# Patient Record
Sex: MALE | Race: WHITE | NOT HISPANIC OR LATINO | Employment: FULL TIME | URBAN - METROPOLITAN AREA
[De-identification: names, ages, dates, MRNs, and addresses within clinical notes are randomized per-mention and may not be internally consistent; named-entity substitution may affect disease eponyms.]

---

## 2017-04-05 ENCOUNTER — OFFICE VISIT (OUTPATIENT)
Dept: URGENT CARE | Facility: URGENT CARE | Age: 52
End: 2017-04-05
Payer: COMMERCIAL

## 2017-04-05 VITALS
WEIGHT: 193 LBS | DIASTOLIC BLOOD PRESSURE: 74 MMHG | OXYGEN SATURATION: 98 % | TEMPERATURE: 98.7 F | HEART RATE: 58 BPM | SYSTOLIC BLOOD PRESSURE: 122 MMHG

## 2017-04-05 DIAGNOSIS — J02.9 SORE THROAT: Primary | ICD-10-CM

## 2017-04-05 DIAGNOSIS — J06.9 VIRAL UPPER RESPIRATORY TRACT INFECTION: ICD-10-CM

## 2017-04-05 LAB
DEPRECATED S PYO AG THROAT QL EIA: NORMAL
MICRO REPORT STATUS: NORMAL
SPECIMEN SOURCE: NORMAL

## 2017-04-05 PROCEDURE — 87081 CULTURE SCREEN ONLY: CPT | Performed by: FAMILY MEDICINE

## 2017-04-05 PROCEDURE — 87880 STREP A ASSAY W/OPTIC: CPT | Performed by: FAMILY MEDICINE

## 2017-04-05 PROCEDURE — 99203 OFFICE O/P NEW LOW 30 MIN: CPT | Performed by: PHYSICIAN ASSISTANT

## 2017-04-05 NOTE — NURSING NOTE
Chief Complaint   Patient presents with     Urgent Care     Sick     Miserable since Saturday, daughter has strep.     Initial /74  Pulse 58  Temp 98.7  F (37.1  C) (Oral)  Wt 193 lb (87.5 kg)  SpO2 98% There is no height or weight on file to calculate BMI.  BP completed using cuff size  large    Sandra Jean-Baptiste/CMA

## 2017-04-05 NOTE — MR AVS SNAPSHOT
"              After Visit Summary   2017    Isak Clemons    MRN: [id number]           Patient Information     Date Of Birth          1965        Visit Information        Provider Department      2017 4:15 PM Elaina Bnasal PA-C [facility]        Today's Diagnoses     Sore throat    -  1    Viral upper respiratory tract infection           Follow-ups after your visit        Who to contact     If you have questions or need follow up information about today's clinic visit or your schedule please contact [facility] directly at [phone].  Normal or non-critical lab and imaging results will be communicated to you by Augmenixhart, letter or phone within 4 business days after the clinic has received the results. If you do not hear from us within 7 days, please contact the clinic through Androcialt or phone. If you have a critical or abnormal lab result, we will notify you by phone as soon as possible.  Submit refill requests through OrthAlign or call your pharmacy and they will forward the refill request to us. Please allow 3 business days for your refill to be completed.          Additional Information About Your Visit        MyChart Information     Or[facility]Align lets you send messages to your doctor, view your test results, renew your prescriptions, schedule appointments and more. To sign up, go to [email or web address] . Click on \"Log in\" on the left side of the screen, which will take you to the Welcome page. Then click on \"Sign up Now\" on the right side of the page.     You will be asked to enter the access code listed below, as well as some personal information. Please follow the directions to create your username and password.     Your access code is: [email or web address]  Expires: 2017  5:30 PM     Your access code will  in 90 days. If you need help or a new code, please call your [place] clinic or [phone].        Care EveryWhere ID     This is your Care EveryWhere " ID. This could be used by other organizations to access your [place] medical records  [id number]        Your Vitals Were     Pulse Temperature Pulse Oximetry             58 98.7  F (37.1  C) (Oral) 98%          Blood Pressure from Last 3 Encounters:   04/05/17 122/74    Weight from Last 3 Encounters:   04/05/17 193 lb (87.5 kg)              We Performed the Following     Beta strep group A culture     Strep, Rapid Screen        Primary Care Provider    None Specified       No primary provider on file.        Thank you!     Thank you for choosing [facility]  for your care. Our goal is always to provide you with excellent care. Hearing back from our patients is one way we can continue to improve our services. Please take a few minutes to complete the written survey that you may receive in the mail after your visit with us. Thank you!             Your Updated Medication List - Protect others around you: Learn how to safely use, store and throw away your medicines at www.disposemymeds.org.      Notice  As of 4/5/2017  5:30 PM    You have not been prescribed any medications.

## 2017-04-05 NOTE — PROGRESS NOTES
"SUBJECTIVE:   Isak Clemons is a 51 year old male presenting with a chief complaint of fever, \"cold symptoms\", cough  and myalgias.  Onset of symptoms was 4 day(s) ago.  Course of illness is same and improving.    Severity moderate  Current and Associated symptoms: cough  Treatment measures tried include Fluids, OTC meds and Rest.  Predisposing factors include strep exposure from daughter.     No past medical history on file.  No current outpatient prescriptions on file.     Social History   Substance Use Topics     Smoking status: Never Smoker     Smokeless tobacco: Not on file     Alcohol use Not on file       ROS:  Review of systems negative except as stated above.    OBJECTIVE  :/74  Pulse 58  Temp 98.7  F (37.1  C) (Oral)  Wt 193 lb (87.5 kg)  SpO2 98%  GENERAL APPEARANCE: healthy, alert and no distress  EYES: EOMI,  PERRL, conjunctiva clear  HENT: TM's normal bilaterally, nasal turbinates erythematous, swollen and rhinorrhea clear. Oropharynx erythematous.   NECK: supple, nontender, no lymphadenopathy  RESP: lungs clear to auscultation - no rales, rhonchi or wheezes  CV: regular rates and rhythm, normal S1 S2, no murmur noted  ABDOMEN:  soft, nontender, no HSM or masses and bowel sounds normal  NEURO: Normal strength and tone, sensory exam grossly normal,  normal speech and mentation  SKIN: no suspicious lesions or rashes    Results for orders placed or performed in visit on 04/05/17   Strep, Rapid Screen   Result Value Ref Range    Specimen Description Throat     Rapid Strep A Screen       NEGATIVE: No Group A streptococcal antigen detected by immunoassay, await   culture report.      Micro Report Status FINAL 04/05/2017        ASSESSMENT/PLAN:  1. Sore throat        Push fluids and rest. Honey for cough, humidified air at night.   - Strep, Rapid Screen  - Beta strep group A culture    I have discussed the patient's diagnosis and my plan of treatment with the patient. We went over any labs or imaging. " Patient is aware to come back in with worsening symptoms or if no relief despite treatment plan.  Patient verbalizes understanding. All questions were addressed and answered.   Elaina Bansal PA-C

## 2017-04-07 LAB
BACTERIA SPEC CULT: NORMAL
MICRO REPORT STATUS: NORMAL
SPECIMEN SOURCE: NORMAL

## 2023-02-02 ENCOUNTER — ANCILLARY PROCEDURE (OUTPATIENT)
Dept: GENERAL RADIOLOGY | Facility: CLINIC | Age: 58
End: 2023-02-02
Payer: COMMERCIAL

## 2023-02-02 ENCOUNTER — OFFICE VISIT (OUTPATIENT)
Dept: PEDIATRICS | Facility: CLINIC | Age: 58
End: 2023-02-02
Payer: COMMERCIAL

## 2023-02-02 VITALS
OXYGEN SATURATION: 97 % | RESPIRATION RATE: 16 BRPM | WEIGHT: 204 LBS | SYSTOLIC BLOOD PRESSURE: 134 MMHG | HEART RATE: 53 BPM | TEMPERATURE: 97.6 F | DIASTOLIC BLOOD PRESSURE: 86 MMHG

## 2023-02-02 DIAGNOSIS — J93.9 PNEUMOTHORAX, LEFT: Primary | ICD-10-CM

## 2023-02-02 DIAGNOSIS — J93.9 PNEUMOTHORAX, LEFT: ICD-10-CM

## 2023-02-02 PROCEDURE — 99203 OFFICE O/P NEW LOW 30 MIN: CPT | Mod: GC | Performed by: STUDENT IN AN ORGANIZED HEALTH CARE EDUCATION/TRAINING PROGRAM

## 2023-02-02 PROCEDURE — 71046 X-RAY EXAM CHEST 2 VIEWS: CPT | Mod: TC | Performed by: RADIOLOGY

## 2023-02-02 RX ORDER — ACETAMINOPHEN 325 MG/1
650 TABLET ORAL
COMMUNITY
Start: 2023-01-27 | End: 2023-12-11

## 2023-02-02 RX ORDER — OXYCODONE HYDROCHLORIDE 5 MG/1
2.5 TABLET ORAL
COMMUNITY
Start: 2023-01-27 | End: 2023-12-11

## 2023-02-02 ASSESSMENT — PAIN SCALES - GENERAL: PAINLEVEL: MILD PAIN (2)

## 2023-02-02 NOTE — PROGRESS NOTES
Assessment & Plan     Pneumothorax, left  Patient seen in follow-up for traumatic left-sided tiny apical pneumothorax.  Chest x-ray with question of potential residual left-sided apical pneumothorax as well as obvious persistent left-sided pleural effusion.  Clinically is significantly improved and vital signs are stable.  Awaiting radiology read.  If radiology read is consistent with pneumothorax patient return in 1 week for repeat imaging at that time.  If follow-up imaging demonstrates persistence of pneumothorax would consider surgical referral for potential intervention/pleurodesis versus if radiology read today does not show any further pneumothorax patient can be cleared as clinically resolved and if no radiographic evidence would be completely clear.  As next plane trip is not for several more weeks at which time would likely be safe to fly.  - XR Chest 2 Views  - XR Chest 2 Views      F/u pending results of CXR  *RESULTS RETURNED - no PTX per radiology, pt updated over the phone    Pt discussed w/staff attending physician, Dr. Zayas.  Jean Marie Okeefe MD  [facility] [place] Parisi is a 57 year old accompanied by his partner, presenting for the following health issues:  Follow Up      HPI     ED/UC Followup:    Facility:  [facility]  Date of visit: 1/26/2023  Reason for visit: pneumothorax left, closed fracture of a rib left  Current Status: stable    Patient was playing hockey when he fell on the ice and developed rib fractures of the seventh and eighth rib was found to also have a tiny apical pneumothorax on CT imaging at [facility] on 1/26/23 was observed overnight without issue and discharged with as needed oxycodone..    Since being home has noted improvement in his shortness of breath as well as in his pleuritic chest pain which has completely resolved.  Still notices some pain to palpation of his ribs but no apparent discomfort.  No chest pain.  No  syncope.  No dyspnea.  He is still sleeping in a chair however this is because of tenderness to the ribs on the side.    of note patient and wife are planning on taking a trip in 1 month at the beginning of March 2023 which would include airplane ride.    Review of Systems   Constitutional, HEENT, cardiovascular, pulmonary, gi and gu systems are negative, except as otherwise noted.      Objective    /86   Pulse 53   Temp 97.6  F (36.4  C)   Resp 16   Wt 92.5 kg (204 lb)   SpO2 97%   There is no height or weight on file to calculate BMI.  Physical Exam   GENERAL: healthy, alert and no distress  EYES: Eyes grossly normal to inspection,  HENT: ear canals and TM's normal  NECK: no adenopathy, trachea midline  RESP: lungs clear to auscultation - no rales, rhonchi or wheezes  CV: regular rate and rhythm, WWP, no LE edema  MS: no gross musculoskeletal defects noted, no edema  SKIN: no suspicious lesions or rashes  NEURO: Normal strength and tone, mentation intact and speech normal  PSYCH: mentation appears normal, affect normal/bright    Xray - Reviewed and interpreted by me.  ? Of residual left ptx, and left pleural effusion. Awaiting rads read

## 2023-04-23 ENCOUNTER — HEALTH MAINTENANCE LETTER (OUTPATIENT)
Age: 58
End: 2023-04-23

## 2023-12-11 ENCOUNTER — OFFICE VISIT (OUTPATIENT)
Dept: PEDIATRICS | Facility: CLINIC | Age: 58
End: 2023-12-11
Payer: COMMERCIAL

## 2023-12-11 VITALS
HEART RATE: 65 BPM | WEIGHT: 200.9 LBS | BODY MASS INDEX: 29.76 KG/M2 | TEMPERATURE: 96.8 F | OXYGEN SATURATION: 97 % | HEIGHT: 69 IN | SYSTOLIC BLOOD PRESSURE: 132 MMHG | DIASTOLIC BLOOD PRESSURE: 82 MMHG | RESPIRATION RATE: 16 BRPM

## 2023-12-11 DIAGNOSIS — M23.306 MENISCUS DEGENERATION, RIGHT: ICD-10-CM

## 2023-12-11 DIAGNOSIS — Z01.818 PREOP GENERAL PHYSICAL EXAM: Primary | ICD-10-CM

## 2023-12-11 PROCEDURE — 99214 OFFICE O/P EST MOD 30 MIN: CPT | Performed by: PEDIATRICS

## 2023-12-11 ASSESSMENT — PAIN SCALES - GENERAL: PAINLEVEL: NO PAIN (0)

## 2023-12-11 NOTE — PROGRESS NOTES
[facility]  [address]  [address]  [place] [address]  Phone: [phone]  Fax: [phone]  Primary Provider: No Ref-Primary, Physician  Pre-op Performing Provider: CRISTIAN NELSON      PREOPERATIVE EVALUATION:  Today's date: 12/11/2023    Isak is a 58 year old, presenting for the following:  Pre-Op Exam        12/11/2023     3:30 PM   Additional Questions   Roomed by immanuel   Accompanied by na         12/11/2023     3:30 PM   Patient Reported Additional Medications   Patient reports taking the following new medications na       Surgical Information:  Surgery/Procedure: right knee meniscal repair  Surgery Location: [facility]   Surgeon:    Surgery Date: 1/3/2024  Time of Surgery: tbd  Where patient plans to recover: At home with family  Fax number for surgical facility: [phone]    Assessment & Plan     The proposed surgical procedure is considered INTERMEDIATE risk.    ICD-10-CM    1. Preop general physical exam  Z01.818       2. Meniscus degeneration, right  M23.306                  - No identified additional risk factors other than previously addressed    Antiplatelet or Anticoagulation Medication Instructions:   - Patient is on no antiplatelet or anticoagulation medications.    Additional Medication Instructions:  Patient is on no additional chronic medications    RECOMMENDATION:  APPROVAL GIVEN to proceed with proposed procedure, without further diagnostic evaluation.        Subjective       HPI related to upcoming procedure: Right knee meniscal injury requiring surgical         12/11/2023     3:21 PM   Preop Questions   1. Have you ever had a heart attack or stroke? No   2. Have you ever had surgery on your heart or blood vessels, such as a stent placement, a coronary artery bypass, or surgery on an artery in your head, neck, heart, or legs? No   3. Do you have chest pain with activity? No   4. Do you have a history of  heart failure? No   5. Do  "you currently have a cold, bronchitis or symptoms of other infection? Yes - cold 2 weeks ago - has now recovered   6. Do you have a cough, shortness of breath, or wheezing? No   7. Do you or anyone in your family have previous history of blood clots? No   8. Do you or does anyone in your family have a serious bleeding problem such as prolonged bleeding following surgeries or cuts? No   9. Have you ever had problems with anemia or been told to take iron pills? No   10. Have you had any abnormal blood loss such as black, tarry or bloody stools? No   11. Have you ever had a blood transfusion? No   12. Are you willing to have a blood transfusion if it is medically needed before, during, or after your surgery? Yes   13. Have you or any of your relatives ever had problems with anesthesia? No   14. Do you have sleep apnea, excessive snoring or daytime drowsiness? No   15. Do you have any artifical heart valves or other implanted medical devices like a pacemaker, defibrillator, or continuous glucose monitor? No   16. Do you have artificial joints? No   17. Are you allergic to latex? No             Review of Systems  Constitutional, neuro, ENT, endocrine, pulmonary, cardiac, gastrointestinal, genitourinary, musculoskeletal, integument and psychiatric systems are negative, except as otherwise noted.    There are no problems to display for this patient.     No past medical history on file.  Past Surgical History:   Procedure Laterality Date    COLONOSCOPY      VASECTOMY Bilateral      No current outpatient medications on file.       No Known Allergies     Social History     Tobacco Use    Smoking status: Never    Smokeless tobacco: Never   Substance Use Topics    Alcohol use: Not on file     History   Drug Use Not on file         Objective     /82   Pulse 65   Temp 96.8  F (36  C) (Tympanic)   Resp 16   Ht 1.753 m (5' 9\")   Wt 91.1 kg (200 lb 14.4 oz)   SpO2 97%   BMI 29.67 kg/m    Wt Readings from Last 4 " Encounters:   12/11/23 91.1 kg (200 lb 14.4 oz)   02/02/23 92.5 kg (204 lb)   04/05/17 87.5 kg (193 lb)     In great shape - plays hockey, pickle ball at baseline.   Works out at [employer].  No cardiac, pulmonary, renal history.        Physical Exam    GENERAL APPEARANCE: healthy, alert and no distress     EYES: EOMI,  PERRL     HENT: ear canals and TM's normal and nose and mouth without ulcers or lesions     NECK: no adenopathy, no asymmetry, masses, or scars and thyroid normal to palpation     RESP: lungs clear to auscultation - no rales, rhonchi or wheezes     CV: regular rates and rhythm, normal S1 S2, no S3 or S4 and no murmur, click or rub     ABDOMEN:  soft, nontender, no HSM or masses and bowel sounds normal     MS: extremities normal- no gross deformities noted, no evidence of inflammation in joints, FROM in all extremities.     SKIN: no suspicious lesions or rashes     NEURO: Normal strength and tone, sensory exam grossly normal, mentation intact and speech normal     PSYCH: mentation appears normal. and affect normal/bright    Diagnostics:  No labs were ordered during this visit.   No EKG required, no history of coronary heart disease, significant arrhythmia, peripheral arterial disease or other structural heart disease.    Revised Cardiac Risk Index (RCRI):  The patient has the following serious cardiovascular risks for perioperative complications:   - No serious cardiac risks = 0 points     RCRI Interpretation: 0 points: Class I (very low risk - 0.4% complication rate)         Signed Electronically by: Bibi Templeton MD  Copy of this evaluation report is provided to requesting physician.

## 2024-04-04 ENCOUNTER — OFFICE VISIT (OUTPATIENT)
Dept: INTERNAL MEDICINE | Facility: CLINIC | Age: 59
End: 2024-04-04
Payer: COMMERCIAL

## 2024-04-04 VITALS
TEMPERATURE: 98.4 F | DIASTOLIC BLOOD PRESSURE: 84 MMHG | HEIGHT: 69 IN | RESPIRATION RATE: 18 BRPM | SYSTOLIC BLOOD PRESSURE: 134 MMHG | BODY MASS INDEX: 30.36 KG/M2 | OXYGEN SATURATION: 97 % | WEIGHT: 205 LBS | HEART RATE: 60 BPM

## 2024-04-04 DIAGNOSIS — R01.1 SYSTOLIC MURMUR: Primary | ICD-10-CM

## 2024-04-04 DIAGNOSIS — R03.0 ELEVATED BP WITHOUT DIAGNOSIS OF HYPERTENSION: ICD-10-CM

## 2024-04-04 PROCEDURE — 99213 OFFICE O/P EST LOW 20 MIN: CPT | Mod: 25 | Performed by: NURSE PRACTITIONER

## 2024-04-04 PROCEDURE — 93000 ELECTROCARDIOGRAM COMPLETE: CPT | Performed by: NURSE PRACTITIONER

## 2024-04-04 NOTE — PROGRESS NOTES
"  Assessment & Plan     Systolic murmur  On exam noted a systolic murmur.  EKG sinus bradycardia  Will check echocardiogram  - Echocardiogram Complete; Future  - EKG 12-lead complete w/read - Clinics    Elevated BP without diagnosis of hypertension  In reasonable range in clinic.  He has a daughter who can check his blood pressure at home and we discussed how to do it accurately.  He will watch it in 3 months time and will come back in for a blood pressure check unless his blood pressure is very high at home prior to that  - EKG 12-lead complete w/read - Clinics      25 minutes spent by me on the date of the encounter doing chart review, history and exam, documentation and further activities per the note      BMI  Estimated body mass index is 30.27 kg/m  as calculated from the following:    Height as of this encounter: 1.753 m (5' 9\").    Weight as of this encounter: 93 kg (205 lb).   Weight management plan: Discussed healthy diet and exercise guidelines      Patient Instructions   Work on diet and exercise     We would like blood pressure less then 140/90     Recheck in 3 months     Echocardiogram     Subjective   Isak is a 58 year old, presenting for the following health issues:  Hypertension (Elevated bp)    Blood pressure 154-159/90 at home   Daughter is nursing student and takes blood pressure with cuff at home    In office blood pressure is within normal range     He does not like needles so would prefer not to get lab work done today, but will do it in 3 months time when he comes back.  Discussed that if we do start blood pressure medicine the likely medication we would try first would be losartan    Discussed normal blood pressure goal less then 140/90  Can work on diet and exercise and recheck in 3 months     On exam systolic murmur noted and EKG SINUS FRANCISCO  Will do echo      Review of Systems  Constitutional, neuro, ENT, endocrine, pulmonary, cardiac, gastrointestinal, genitourinary, musculoskeletal, " "integument and psychiatric systems are negative, except as otherwise noted.      Objective    /84   Pulse 60   Temp 98.4  F (36.9  C) (Oral)   Resp 18   Ht 1.753 m (5' 9\")   Wt 93 kg (205 lb)   SpO2 97%   BMI 30.27 kg/m    Body mass index is 30.27 kg/m .  Physical Exam   GENERAL: alert and no distress  RESP: lungs clear to auscultation - no rales, rhonchi or wheezes  CV: regular rate and rhythm, normal S1 S2, no S3 or S4, no murmur, click or rub, no peripheral edema  MS: no gross musculoskeletal defects noted, no edema  NEURO: Normal strength and tone, mentation intact and speech normal  PSYCH: mentation appears normal, affect normal/bright    Echo   He declined lab today         Signed Electronically by: MINERVA Garcia CNP    Answers submitted by the patient for this visit:  General Questionnaire (Submitted on 4/4/2024)  Chief Complaint: Chronic problems general questions HPI Form  How many servings of fruits and vegetables do you eat daily?: 0-1  On average, how many sweetened beverages do you drink each day (Examples: soda, juice, sweet tea, etc.  Do NOT count diet or artificially sweetened beverages)?: 1  How many minutes a day do you exercise enough to make your heart beat faster?: 30 to 60  How many days a week do you exercise enough to make your heart beat faster?: 4  How many days per week do you miss taking your medication?: 0  General Concern (Submitted on 4/4/2024)  Chief Complaint: Chronic problems general questions HPI Form  What is the reason for your visit today?: high bp  When did your symptoms begin?: More than a month    "

## 2024-04-04 NOTE — PATIENT INSTRUCTIONS
Work on diet and exercise     We would like blood pressure less then 140/90     Recheck in 3 months     Echocardiogram

## 2024-04-19 ENCOUNTER — HOSPITAL ENCOUNTER (OUTPATIENT)
Dept: CARDIOLOGY | Facility: CLINIC | Age: 59
Discharge: HOME | End: 2024-04-19
Attending: NURSE PRACTITIONER | Admitting: NURSE PRACTITIONER
Payer: COMMERCIAL

## 2024-04-19 ENCOUNTER — TELEPHONE (OUTPATIENT)
Dept: CARDIOLOGY | Facility: CLINIC | Age: 59
End: 2024-04-19
Payer: COMMERCIAL

## 2024-04-19 ENCOUNTER — TELEPHONE (OUTPATIENT)
Dept: INTERNAL MEDICINE | Facility: CLINIC | Age: 59
End: 2024-04-19

## 2024-04-19 DIAGNOSIS — I10 BENIGN ESSENTIAL HYPERTENSION: ICD-10-CM

## 2024-04-19 DIAGNOSIS — R01.1 SYSTOLIC MURMUR: ICD-10-CM

## 2024-04-19 DIAGNOSIS — I51.7 LVH (LEFT VENTRICULAR HYPERTROPHY): ICD-10-CM

## 2024-04-19 DIAGNOSIS — R01.1 SYSTOLIC MURMUR: Primary | ICD-10-CM

## 2024-04-19 LAB — LVEF ECHO: NORMAL

## 2024-04-19 PROCEDURE — 93306 TTE W/DOPPLER COMPLETE: CPT | Mod: 26 | Performed by: INTERNAL MEDICINE

## 2024-04-19 PROCEDURE — 93306 TTE W/DOPPLER COMPLETE: CPT

## 2024-04-19 RX ORDER — LOSARTAN POTASSIUM AND HYDROCHLOROTHIAZIDE 12.5; 5 MG/1; MG/1
1 TABLET ORAL DAILY
Qty: 30 TABLET | Refills: 1 | Status: SHIPPED | OUTPATIENT
Start: 2024-04-19 | End: 2024-05-15

## 2024-04-19 NOTE — TELEPHONE ENCOUNTER
Attempted to contact patient. Left voice message to call back.     Kylah Kaufman RN  [facility]

## 2024-04-19 NOTE — TELEPHONE ENCOUNTER
Blood pressure continues to be high   We should treat blood pressure   Is he willing?     Would also like him to see cardiology re echocardiogram finding of left ventricular hypertrophy - just to see if any further testing warranted   Referral placed

## 2024-04-19 NOTE — TELEPHONE ENCOUNTER
Called patient and relayed message from provider. Patient verbalized understanding,    Assisted in scheduling an appointment for lab and a MA appointment for a BP check.     Next 5 appointments (look out 90 days)      May 20, 2024  3:30 PM  MA Visit with BASSAM HARVEY MA/LPN  [facility] ([facility] ) [address]  [address]  [facility] [address]  [phone]

## 2024-04-19 NOTE — TELEPHONE ENCOUNTER
Losartan hydrochlorothiazide 50/12.5 mg   1 tab daily     Usually it is well tolerated and you do not feel you are taking the medication      Sometimes people may void more often for the first week but then evens out     When changing positions make sure you feel ok before going to next position - like sit to stand and stand to walking   Some people could feel dizzy, but that is often in older adults     Usually take in the morning with or without food     Recheck lab in 2 weeks     Recheck blood pressure in 4 weeks

## 2024-04-19 NOTE — TELEPHONE ENCOUNTER
Bibi-RN from Cardiology calling stating patient having high blood pressure readings during Echocardiogram today.  Also noting bradycardia.       Hr running 47-60- patient stated HR is normally low.  They did hear a murmur, otherwise normal.    Blood pressures running 180s/107 and 170/low 100s.  Rn checked manual 162/96. Patient is asymptomatic.     Echo tech spoke with one of the cardiologists and stated that if patient is asymptomatic, doesn't need to go to ER.  Doesn't take any blood pressure medication.  Blood pressures have been running 160s/100s at home    Echo tech stated heart valves are pretty thick.      Please call patient with provider recommendation.    Patient doesn't have a primary care provider and last office visit with Zelda Gutierrez.

## 2024-04-19 NOTE — TELEPHONE ENCOUNTER
Call to pt and advised. He agrees with starting a medication.   Would like a call back with the name of medication and any potential side effects to watch for.     He also agrees with Cardiologist referral. He verbalized understanding.

## 2024-04-19 NOTE — TELEPHONE ENCOUNTER
"Echo tech came and spoke to this RN. Pt was having echo completed and noted to have BPs 170s-180s / 100s. HR 40s.     RN went to assess pt. Denies headaches, dizziness, LH, burred vision, numbness or tingling. Reports he feels fine\". He denies feeling nervous or anxious.   Pt is not on any medications. Pt is not established with cardiology or PCP. He did recently see Zelda Gutierrez CNP on 4/4/2024 and echo was ordered.     RN completed manual BP: 162/96, HRs 47-60.    Pt will check BPs/HRs at home. He will go to ER if he develops symptoms or BP continues to be high and HR low. He is agreeable and comfortable with plan. I did let pt know I would call to [facility] to alert them. He expressed understanding.     RN called to Zelda's office and spoke to RN. Report given.         4/4/2024: OV:   /84, HR 60.  Systolic murmur  On exam noted a systolic murmur.  EKG sinus bradycardia  Will check echocardiogram  - Echocardiogram Complete; Future  - EKG 12-lead complete w/read - Clinics     Elevated BP without diagnosis of hypertension  In reasonable range in clinic.  He has a daughter who can check his blood pressure at home and we discussed how to do it accurately.  He will watch it in 3 months time and will come back in for a blood pressure check unless his blood pressure is very high at home prior to that  - EKG 12-lead complete w/read - Clinics            No future appointments.    ARTIS Jordan, RN 9:11 AM 04/19/24    "

## 2024-04-22 ENCOUNTER — DOCUMENTATION ONLY (OUTPATIENT)
Dept: LAB | Facility: CLINIC | Age: 59
End: 2024-04-22
Payer: COMMERCIAL

## 2024-04-22 DIAGNOSIS — I10 BENIGN ESSENTIAL HYPERTENSION: Primary | ICD-10-CM

## 2024-04-22 NOTE — PROGRESS NOTES
Isak Clemons has an upcoming lab appointment and is eligible to have due Health Maintenance labs drawn per Health Maintenance Protocol Orders (HMPO) process.    Before it can be drawn, a diagnosis is needed for the following lab: HM labs that are due need a diagnosis code    Please review and enter diagnosis as appropriate.     Ashlee Cazares

## 2024-05-15 ENCOUNTER — OFFICE VISIT (OUTPATIENT)
Dept: CARDIOLOGY | Facility: CLINIC | Age: 59
End: 2024-05-15
Attending: NURSE PRACTITIONER
Payer: COMMERCIAL

## 2024-05-15 ENCOUNTER — LAB (OUTPATIENT)
Dept: LAB | Facility: CLINIC | Age: 59
End: 2024-05-15
Payer: COMMERCIAL

## 2024-05-15 ENCOUNTER — TELEPHONE (OUTPATIENT)
Dept: INTERNAL MEDICINE | Facility: CLINIC | Age: 59
End: 2024-05-15

## 2024-05-15 VITALS
SYSTOLIC BLOOD PRESSURE: 140 MMHG | DIASTOLIC BLOOD PRESSURE: 80 MMHG | HEIGHT: 69 IN | WEIGHT: 203.6 LBS | HEART RATE: 62 BPM | BODY MASS INDEX: 30.16 KG/M2 | OXYGEN SATURATION: 98 %

## 2024-05-15 DIAGNOSIS — I10 BENIGN ESSENTIAL HYPERTENSION: ICD-10-CM

## 2024-05-15 DIAGNOSIS — I51.7 LVH (LEFT VENTRICULAR HYPERTROPHY): ICD-10-CM

## 2024-05-15 DIAGNOSIS — R01.1 SYSTOLIC MURMUR: ICD-10-CM

## 2024-05-15 DIAGNOSIS — Z11.59 NEED FOR HEPATITIS C SCREENING TEST: ICD-10-CM

## 2024-05-15 DIAGNOSIS — Z11.4 SCREENING FOR HIV (HUMAN IMMUNODEFICIENCY VIRUS): ICD-10-CM

## 2024-05-15 LAB
BASOPHILS # BLD AUTO: 0 10E3/UL (ref 0–0.2)
BASOPHILS NFR BLD AUTO: 1 %
EOSINOPHIL # BLD AUTO: 0.1 10E3/UL (ref 0–0.7)
EOSINOPHIL NFR BLD AUTO: 2 %
ERYTHROCYTE [DISTWIDTH] IN BLOOD BY AUTOMATED COUNT: 12.9 % (ref 10–15)
HCT VFR BLD AUTO: 45.6 % (ref 40–53)
HCV AB SERPL QL IA: NONREACTIVE
HGB BLD-MCNC: 15.3 G/DL (ref 13.3–17.7)
HIV 1+2 AB+HIV1 P24 AG SERPL QL IA: NONREACTIVE
IMM GRANULOCYTES # BLD: 0 10E3/UL
IMM GRANULOCYTES NFR BLD: 0 %
LYMPHOCYTES # BLD AUTO: 1.8 10E3/UL (ref 0.8–5.3)
LYMPHOCYTES NFR BLD AUTO: 34 %
MCH RBC QN AUTO: 28.8 PG (ref 26.5–33)
MCHC RBC AUTO-ENTMCNC: 33.6 G/DL (ref 31.5–36.5)
MCV RBC AUTO: 86 FL (ref 78–100)
MONOCYTES # BLD AUTO: 0.5 10E3/UL (ref 0–1.3)
MONOCYTES NFR BLD AUTO: 10 %
NEUTROPHILS # BLD AUTO: 2.8 10E3/UL (ref 1.6–8.3)
NEUTROPHILS NFR BLD AUTO: 53 %
PLATELET # BLD AUTO: 243 10E3/UL (ref 150–450)
RBC # BLD AUTO: 5.31 10E6/UL (ref 4.4–5.9)
WBC # BLD AUTO: 5.2 10E3/UL (ref 4–11)

## 2024-05-15 PROCEDURE — 87389 HIV-1 AG W/HIV-1&-2 AB AG IA: CPT

## 2024-05-15 PROCEDURE — 84443 ASSAY THYROID STIM HORMONE: CPT

## 2024-05-15 PROCEDURE — 80053 COMPREHEN METABOLIC PANEL: CPT

## 2024-05-15 PROCEDURE — 86803 HEPATITIS C AB TEST: CPT

## 2024-05-15 PROCEDURE — 85025 COMPLETE CBC W/AUTO DIFF WBC: CPT

## 2024-05-15 PROCEDURE — 99204 OFFICE O/P NEW MOD 45 MIN: CPT | Performed by: INTERNAL MEDICINE

## 2024-05-15 PROCEDURE — 80061 LIPID PANEL: CPT

## 2024-05-15 PROCEDURE — 36415 COLL VENOUS BLD VENIPUNCTURE: CPT

## 2024-05-15 RX ORDER — LOSARTAN POTASSIUM AND HYDROCHLOROTHIAZIDE 12.5; 5 MG/1; MG/1
1 TABLET ORAL DAILY
Qty: 180 TABLET | Refills: 3 | Status: SHIPPED | OUTPATIENT
Start: 2024-05-15 | End: 2024-06-18

## 2024-05-15 NOTE — TELEPHONE ENCOUNTER
Increase losartan hydrochlorothiazide to 2 tab daily    Recheck bp in a month     Blood pressure at cardiology still high today

## 2024-05-15 NOTE — PROGRESS NOTES
HISTORY:    Isak Clemons is a pleasant 58-year-old gentleman without significant medical problems other than hypertension who was asked to see me because of an abnormal echocardiogram.  He also was told that he has an audible murmur and was concerned about that.  He is accompanied by his wife today.    Isak's daughter just graduated from nursing school and has been checking his blood pressure at home.  It is frequently in the 160 range.  He is off his blood pressure readings have all been normal for the last couple of years with no values greater than 140.  He was noted by a caregiver to have an audible murmur and an echocardiogram was done to evaluate the murmur.  I personally reviewed the images of the echocardiogram and his valve function is normal.  Does have LVH.  This was called severe by the reader but in most views it appears only moderate.  LV function is normal.  At the time of his echocardiogram his blood pressure was very high at 184/124.    Isak is physically fit and very active.  He plays hockey, lifts weights, and exercises regularly without limitation.  He denies exertional chest pain, PND/orthopnea, syncope or near syncope, palpitations, peripheral edema, or symptoms of claudication.  Expresses no cardiovascular concerns.    On examination I hear a 1/6 murmur only at the right upper sternal border, otherwise normal cardiac exam.    ECG shows normal sinus rhythm with mild nonspecific ST-T wave changes.  He does not have voltage criteria for LVH.      ASSESSMENT/PLAN:    1.  Hypertension.  The patient appears to have masked hypertension referring to high blood pressure at home with normal blood pressure in the office.  He does have some LVH as a consequence of this but does not have voltage criteria for LVH.  Otherwise, his heart appears normal by echocardiography.  He has grade 1 diastolic dysfunction with estimated filling pressures in the intermediate range (not elevated).  Given that his ability  to exercise vigorously I do not think we need to be concerned about other causes of cardiomyopathy such as HCM or amyloidosis.  He has been started on losartan hydrochlorothiazide 50-12 0.5 and has noticed a substantial drop in his home blood pressure readings.  Office reading today is slightly high at 140/80.  I will leave it to his primary care physician to manage his blood pressure going forward.  No further cardiac evaluation is needed.    Thank you for inviting me to participate in the care of your patient.  Please don't hesitate to call if I can be of further assistance.  45 minutes were spent today reviewing the chart and other records, interviewing and examining the patient, and documenting our visit.    Chart documentation was completed, in part, with [employer] voice-recognition software. Even though reviewed, some grammatical, spelling, and word errors may remain.       No orders of the defined types were placed in this encounter.    No orders of the defined types were placed in this encounter.    There are no discontinued medications.    10 year ASCVD risk: The ASCVD Risk score (Candler DK, et al., 2019) failed to calculate for the following reasons:    Cannot find a previous HDL lab    Cannot find a previous total cholesterol lab    Encounter Diagnoses   Name Primary?    Systolic murmur     LVH (left ventricular hypertrophy)     Benign essential hypertension        CURRENT MEDICATIONS:  Current Outpatient Medications   Medication Sig Dispense Refill    losartan-hydrochlorothiazide (HYZAAR) 50-12.5 MG tablet Take 1 tablet by mouth daily 30 tablet 1       ALLERGIES   No Known Allergies    PAST MEDICAL HISTORY:  History reviewed. No pertinent past medical history.    PAST SURGICAL HISTORY:  Past Surgical History:   Procedure Laterality Date    COLONOSCOPY      VASECTOMY Bilateral        FAMILY HISTORY:  Family History   Problem Relation Age of Onset    Alzheimer Disease Father        SOCIAL HISTORY:  Social  History     Socioeconomic History    Marital status:      Spouse name: None    Number of children: None    Years of education: None    Highest education level: None   Tobacco Use    Smoking status: Never    Smokeless tobacco: Never   Vaping Use    Vaping status: Never Used   Substance and Sexual Activity    Alcohol use: Yes    Drug use: Never    Sexual activity: Yes     Partners: Female     Social Determinants of Health     Financial Resource Strain: Low Risk  (12/11/2023)    Financial Resource Strain     Within the past 12 months, have you or your family members you live with been unable to get utilities (heat, electricity) when it was really needed?: No   Food Insecurity: Low Risk  (12/11/2023)    Food Insecurity     Within the past 12 months, did you worry that your food would run out before you got money to buy more?: No     Within the past 12 months, did the food you bought just not last and you didn t have money to get more?: No   Transportation Needs: Low Risk  (12/11/2023)    Transportation Needs     Within the past 12 months, has lack of transportation kept you from medical appointments, getting your medicines, non-medical meetings or appointments, work, or from getting things that you need?: No    Received from [facility], [facility]    Social Connections   Interpersonal Safety: Low Risk  (12/11/2023)    Interpersonal Safety     Do you feel physically and emotionally safe where you currently live?: Yes     Within the past 12 months, have you been hit, slapped, kicked or otherwise physically hurt by someone?: No     Within the past 12 months, have you been humiliated or emotionally abused in other ways by your partner or ex-partner?: No   Housing Stability: Low Risk  (12/11/2023)    Housing Stability     Do you have housing? : Yes     Are you worried about losing your housing?: No       Review of Systems:  Skin:  Negative     Eyes:   "Positive for glasses  ENT:  Negative    Respiratory:  Negative    Cardiovascular:    Positive for  Gastroenterology: Negative    Genitourinary:  Negative    Musculoskeletal:  Positive for back pain  Neurologic:  Negative    Psychiatric:  Negative    Heme/Lymph/Imm:  Negative    Endocrine:  Negative      Physical Exam:  Vitals: BP (!) 140/80   Pulse 62   Ht 1.753 m (5' 9\")   Wt 92.4 kg (203 lb 9.6 oz)   SpO2 98%   BMI 30.07 kg/m      Constitutional:  cooperative, alert and oriented, well developed, well nourished, in no acute distress        Skin:  warm and dry to the touch, no apparent skin lesions or masses noted        Head:  normocephalic, no masses or lesions        Eyes:  pupils equal and round, conjunctivae and lids unremarkable, sclera white, no xanthalasma, EOMS intact, no nystagmus        ENT:  no pallor or cyanosis, dentition good        Neck:  carotid pulses are full and equal bilaterally, JVP normal, no carotid bruit        Chest:  normal breath sounds, clear to auscultation, normal A-P diameter, normal symmetry, normal respiratory excursion, no use of accessory muscles        Cardiac: regular rhythm, normal S1/S2, no S3 or S4, apical impulse not displaced, no murmurs, gallops or rubs                  Abdomen:  abdomen soft;BS normoactive        Vascular:                                        Extremities and Muscular Skeletal:  no edema           Neurological:  no gross motor deficits        Psych:  affect appropriate, oriented to time, person and place     Recent Lab Results:  LIPID RESULTS:  No results found for: \"CHOL\", \"HDL\", \"LDL\", \"TRIG\", \"CHOLHDLRATIO\"    LIVER ENZYME RESULTS:  No results found for: \"AST\", \"ALT\"    CBC RESULTS:  Lab Results   Component Value Date    WBC 5.2 05/15/2024    RBC 5.31 05/15/2024    HGB 15.3 05/15/2024    HCT 45.6 05/15/2024    MCV 86 05/15/2024    MCH 28.8 05/15/2024    MCHC 33.6 05/15/2024    RDW 12.9 05/15/2024     05/15/2024       BMP RESULTS:  No " "results found for: \"NA\", \"POTASSIUM\", \"CHLORIDE\", \"CO2\", \"ANIONGAP\", \"GLC\", \"BUN\", \"CR\", \"GFRESTIMATED\", \"GFRESTBLACK\", \"KEVIN\"     A1C RESULTS:  No results found for: \"A1C\"    INR RESULTS:  No results found for: \"INR\"      Jean Marie Rodriguez MD, FACC    CC  MINERVA Garcia CNP  [address] [place] [address]                  "

## 2024-05-15 NOTE — TELEPHONE ENCOUNTER
Patient advised increase Losartan hydrochlorothiazide to 2 tabs daily and follow up BP in 1 month.  LINDSEY Shultz R.N.

## 2024-05-15 NOTE — LETTER
5/15/2024    Physician No Ref-Primary  No address on file    RE: Isak Clemons       Dear Colleague,     I had the pleasure of seeing Isak Clemons in the [facility].  HISTORY:    Isak Clemons is a pleasant 58-year-old gentleman without significant medical problems other than hypertension who was asked to see me because of an abnormal echocardiogram.  He also was told that he has an audible murmur and was concerned about that.  He is accompanied by his wife today.    Isak's daughter just graduated from nursing school and has been checking his blood pressure at home.  It is frequently in the 160 range.  He is off his blood pressure readings have all been normal for the last couple of years with no values greater than 140.  He was noted by a caregiver to have an audible murmur and an echocardiogram was done to evaluate the murmur.  I personally reviewed the images of the echocardiogram and his valve function is normal.  Does have LVH.  This was called severe by the reader but in most views it appears only moderate.  LV function is normal.  At the time of his echocardiogram his blood pressure was very high at 184/124.    Isak is physically fit and very active.  He plays hockey, lifts weights, and exercises regularly without limitation.  He denies exertional chest pain, PND/orthopnea, syncope or near syncope, palpitations, peripheral edema, or symptoms of claudication.  Expresses no cardiovascular concerns.    On examination I hear a 1/6 murmur only at the right upper sternal border, otherwise normal cardiac exam.    ECG shows normal sinus rhythm with mild nonspecific ST-T wave changes.  He does not have voltage criteria for LVH.      ASSESSMENT/PLAN:    1.  Hypertension.  The patient appears to have masked hypertension referring to high blood pressure at home with normal blood pressure in the office.  He does have some LVH as a consequence of this but does not have voltage criteria for LVH.   Otherwise, his heart appears normal by echocardiography.  He has grade 1 diastolic dysfunction with estimated filling pressures in the intermediate range (not elevated).  Given that his ability to exercise vigorously I do not think we need to be concerned about other causes of cardiomyopathy such as HCM or amyloidosis.  He has been started on losartan hydrochlorothiazide 50-12 0.5 and has noticed a substantial drop in his home blood pressure readings.  Office reading today is slightly high at 140/80.  I will leave it to his primary care physician to manage his blood pressure going forward.  No further cardiac evaluation is needed.    Thank you for inviting me to participate in the care of your patient.  Please don't hesitate to call if I can be of further assistance.  45 minutes were spent today reviewing the chart and other records, interviewing and examining the patient, and documenting our visit.    Chart documentation was completed, in part, with [employer] voice-recognition software. Even though reviewed, some grammatical, spelling, and word errors may remain.       No orders of the defined types were placed in this encounter.    No orders of the defined types were placed in this encounter.    There are no discontinued medications.    10 year ASCVD risk: The ASCVD Risk score (Max DK, et al., 2019) failed to calculate for the following reasons:    Cannot find a previous HDL lab    Cannot find a previous total cholesterol lab    Encounter Diagnoses   Name Primary?    Systolic murmur     LVH (left ventricular hypertrophy)     Benign essential hypertension        CURRENT MEDICATIONS:  Current Outpatient Medications   Medication Sig Dispense Refill    losartan-hydrochlorothiazide (HYZAAR) 50-12.5 MG tablet Take 1 tablet by mouth daily 30 tablet 1       ALLERGIES   No Known Allergies    PAST MEDICAL HISTORY:  History reviewed. No pertinent past medical history.    PAST SURGICAL HISTORY:  Past Surgical History:    Procedure Laterality Date    COLONOSCOPY      VASECTOMY Bilateral        FAMILY HISTORY:  Family History   Problem Relation Age of Onset    Alzheimer Disease Father        SOCIAL HISTORY:  Social History     Socioeconomic History    Marital status:      Spouse name: None    Number of children: None    Years of education: None    Highest education level: None   Tobacco Use    Smoking status: Never    Smokeless tobacco: Never   Vaping Use    Vaping status: Never Used   Substance and Sexual Activity    Alcohol use: Yes    Drug use: Never    Sexual activity: Yes     Partners: Female     Social Determinants of Health     Financial Resource Strain: Low Risk  (12/11/2023)    Financial Resource Strain     Within the past 12 months, have you or your family members you live with been unable to get utilities (heat, electricity) when it was really needed?: No   Food Insecurity: Low Risk  (12/11/2023)    Food Insecurity     Within the past 12 months, did you worry that your food would run out before you got money to buy more?: No     Within the past 12 months, did the food you bought just not last and you didn t have money to get more?: No   Transportation Needs: Low Risk  (12/11/2023)    Transportation Needs     Within the past 12 months, has lack of transportation kept you from medical appointments, getting your medicines, non-medical meetings or appointments, work, or from getting things that you need?: No    Received from [facility], [facility]    Social Connections   Interpersonal Safety: Low Risk  (12/11/2023)    Interpersonal Safety     Do you feel physically and emotionally safe where you currently live?: Yes     Within the past 12 months, have you been hit, slapped, kicked or otherwise physically hurt by someone?: No     Within the past 12 months, have you been humiliated or emotionally abused in other ways by your partner or ex-partner?: No  "  Housing Stability: Low Risk  (12/11/2023)    Housing Stability     Do you have housing? : Yes     Are you worried about losing your housing?: No       Review of Systems:  Skin:  Negative     Eyes:  Positive for glasses  ENT:  Negative    Respiratory:  Negative    Cardiovascular:    Positive for  Gastroenterology: Negative    Genitourinary:  Negative    Musculoskeletal:  Positive for back pain  Neurologic:  Negative    Psychiatric:  Negative    Heme/Lymph/Imm:  Negative    Endocrine:  Negative      Physical Exam:  Vitals: BP (!) 140/80   Pulse 62   Ht 1.753 m (5' 9\")   Wt 92.4 kg (203 lb 9.6 oz)   SpO2 98%   BMI 30.07 kg/m      Constitutional:  cooperative, alert and oriented, well developed, well nourished, in no acute distress        Skin:  warm and dry to the touch, no apparent skin lesions or masses noted        Head:  normocephalic, no masses or lesions        Eyes:  pupils equal and round, conjunctivae and lids unremarkable, sclera white, no xanthalasma, EOMS intact, no nystagmus        ENT:  no pallor or cyanosis, dentition good        Neck:  carotid pulses are full and equal bilaterally, JVP normal, no carotid bruit        Chest:  normal breath sounds, clear to auscultation, normal A-P diameter, normal symmetry, normal respiratory excursion, no use of accessory muscles        Cardiac: regular rhythm, normal S1/S2, no S3 or S4, apical impulse not displaced, no murmurs, gallops or rubs                  Abdomen:  abdomen soft;BS normoactive        Vascular:                                        Extremities and Muscular Skeletal:  no edema           Neurological:  no gross motor deficits        Psych:  affect appropriate, oriented to time, person and place     Recent Lab Results:  LIPID RESULTS:  No results found for: \"CHOL\", \"HDL\", \"LDL\", \"TRIG\", \"CHOLHDLRATIO\"    LIVER ENZYME RESULTS:  No results found for: \"AST\", \"ALT\"    CBC RESULTS:  Lab Results   Component Value Date    WBC 5.2 05/15/2024    RBC " "5.31 05/15/2024    HGB 15.3 05/15/2024    HCT 45.6 05/15/2024    MCV 86 05/15/2024    MCH 28.8 05/15/2024    MCHC 33.6 05/15/2024    RDW 12.9 05/15/2024     05/15/2024     BMP RESULTS:  No results found for: \"NA\", \"POTASSIUM\", \"CHLORIDE\", \"CO2\", \"ANIONGAP\", \"GLC\", \"BUN\", \"CR\", \"GFRESTIMATED\", \"GFRESTBLACK\", \"KEVIN\"     A1C RESULTS:  No results found for: \"A1C\"    INR RESULTS:  No results found for: \"INR\"    Jean Marie Rodriguez MD, [facility]    CC  Zelda Gutierrez, APRN CNP  [address] [place] [address]      Thank you for allowing me to participate in the care of your patient.      Sincerely,     Jean Marie Rodriguez MD     [facility]  "

## 2024-05-15 NOTE — TELEPHONE ENCOUNTER
Attempt # 1  Called Phone # [phone]      Was Call answered? No.     Non-detailed voicemail left on May 15, 2024 3:02 PM to call clinic at: [phone].     On Call Back:     Please relay Zelda Gutierrez's message and help assist with blood pressure follow up in one month if able.      Thank you,  Dilip Koo, Triage RN [facility]  3:02 PM 5/15/2024

## 2024-05-15 NOTE — TELEPHONE ENCOUNTER
He can use a same-day spot, or he could do a nurse only to get his blood pressure checked  We often like to correlate his blood pressure machine sure that it is accurate.  I am not sure if we have done that

## 2024-05-15 NOTE — TELEPHONE ENCOUNTER
Reason for Call:  appointment    Detailed comments: Dr Gutierrez wanted me to have a follow up in a month couldn't get in. I did do a video visit in one month is it fine to take blood pressure at home or do I have to come in for a nurse visit for a blood pressure checkup?    Phone Number Patient can be reached at: Cell number on file:    Telephone Information:   Mobile [phone]       Best Time: anytime    Can we leave a detailed message on this number? YES    Call taken on 5/15/2024 at 3:21 PM by MELY KEMP

## 2024-05-15 NOTE — TELEPHONE ENCOUNTER
Call to patient to relay provider message. Patient says he want to see Zelda vs nurse only appointment. He will bring his blood pressure cuff/machine with him to the appointment.    Switched video visit to in person visit.     Jun 18, 2024  8:00 AM  (Arrive by 7:40 AM)  Provider Visit with MINERVA Garcia CNP  [facility] ([facility] ) [phone]          Thank you,  Dilip Koo, Triage RN [facility]  3:53 PM 5/15/2024

## 2024-05-16 LAB
ALBUMIN SERPL BCG-MCNC: 4.5 G/DL (ref 3.5–5.2)
ALP SERPL-CCNC: 64 U/L (ref 40–150)
ALT SERPL W P-5'-P-CCNC: 25 U/L (ref 0–70)
ANION GAP SERPL CALCULATED.3IONS-SCNC: 10 MMOL/L (ref 7–15)
AST SERPL W P-5'-P-CCNC: 23 U/L (ref 0–45)
BILIRUB SERPL-MCNC: 0.4 MG/DL
BUN SERPL-MCNC: 20 MG/DL (ref 6–20)
CALCIUM SERPL-MCNC: 9.5 MG/DL (ref 8.6–10)
CHLORIDE SERPL-SCNC: 107 MMOL/L (ref 98–107)
CHOLEST SERPL-MCNC: 172 MG/DL
CREAT SERPL-MCNC: 1.22 MG/DL (ref 0.67–1.17)
DEPRECATED HCO3 PLAS-SCNC: 27 MMOL/L (ref 22–29)
EGFRCR SERPLBLD CKD-EPI 2021: 69 ML/MIN/1.73M2
FASTING STATUS PATIENT QL REPORTED: YES
FASTING STATUS PATIENT QL REPORTED: YES
GLUCOSE SERPL-MCNC: 97 MG/DL (ref 70–99)
HDLC SERPL-MCNC: 36 MG/DL
LDLC SERPL CALC-MCNC: 113 MG/DL
NONHDLC SERPL-MCNC: 136 MG/DL
POTASSIUM SERPL-SCNC: 4.9 MMOL/L (ref 3.4–5.3)
PROT SERPL-MCNC: 6.6 G/DL (ref 6.4–8.3)
SODIUM SERPL-SCNC: 144 MMOL/L (ref 135–145)
TRIGL SERPL-MCNC: 116 MG/DL
TSH SERPL DL<=0.005 MIU/L-ACNC: 2.48 UIU/ML (ref 0.3–4.2)

## 2024-06-18 ENCOUNTER — OFFICE VISIT (OUTPATIENT)
Dept: INTERNAL MEDICINE | Facility: CLINIC | Age: 59
End: 2024-06-18
Payer: COMMERCIAL

## 2024-06-18 VITALS
TEMPERATURE: 97.9 F | SYSTOLIC BLOOD PRESSURE: 134 MMHG | DIASTOLIC BLOOD PRESSURE: 78 MMHG | WEIGHT: 199 LBS | BODY MASS INDEX: 29.47 KG/M2 | HEIGHT: 69 IN | RESPIRATION RATE: 18 BRPM | OXYGEN SATURATION: 99 % | HEART RATE: 55 BPM

## 2024-06-18 DIAGNOSIS — R01.1 SYSTOLIC MURMUR: ICD-10-CM

## 2024-06-18 DIAGNOSIS — I10 BENIGN ESSENTIAL HYPERTENSION: Primary | ICD-10-CM

## 2024-06-18 DIAGNOSIS — I51.7 LVH (LEFT VENTRICULAR HYPERTROPHY): ICD-10-CM

## 2024-06-18 PROCEDURE — G2211 COMPLEX E/M VISIT ADD ON: HCPCS | Performed by: NURSE PRACTITIONER

## 2024-06-18 PROCEDURE — 80048 BASIC METABOLIC PNL TOTAL CA: CPT | Performed by: NURSE PRACTITIONER

## 2024-06-18 PROCEDURE — 36415 COLL VENOUS BLD VENIPUNCTURE: CPT | Performed by: NURSE PRACTITIONER

## 2024-06-18 PROCEDURE — 99214 OFFICE O/P EST MOD 30 MIN: CPT | Performed by: NURSE PRACTITIONER

## 2024-06-18 RX ORDER — LOSARTAN POTASSIUM AND HYDROCHLOROTHIAZIDE 12.5; 5 MG/1; MG/1
1 TABLET ORAL DAILY
Qty: 90 TABLET | Refills: 3 | Status: SHIPPED | OUTPATIENT
Start: 2024-06-18

## 2024-06-18 ASSESSMENT — ANXIETY QUESTIONNAIRES
4. TROUBLE RELAXING: NOT AT ALL
1. FEELING NERVOUS, ANXIOUS, OR ON EDGE: NOT AT ALL
7. FEELING AFRAID AS IF SOMETHING AWFUL MIGHT HAPPEN: NOT AT ALL
GAD7 TOTAL SCORE: 0
8. IF YOU CHECKED OFF ANY PROBLEMS, HOW DIFFICULT HAVE THESE MADE IT FOR YOU TO DO YOUR WORK, TAKE CARE OF THINGS AT HOME, OR GET ALONG WITH OTHER PEOPLE?: NOT DIFFICULT AT ALL
2. NOT BEING ABLE TO STOP OR CONTROL WORRYING: NOT AT ALL
3. WORRYING TOO MUCH ABOUT DIFFERENT THINGS: NOT AT ALL
GAD7 TOTAL SCORE: 0
IF YOU CHECKED OFF ANY PROBLEMS ON THIS QUESTIONNAIRE, HOW DIFFICULT HAVE THESE PROBLEMS MADE IT FOR YOU TO DO YOUR WORK, TAKE CARE OF THINGS AT HOME, OR GET ALONG WITH OTHER PEOPLE: NOT DIFFICULT AT ALL
5. BEING SO RESTLESS THAT IT IS HARD TO SIT STILL: NOT AT ALL
GAD7 TOTAL SCORE: 0
7. FEELING AFRAID AS IF SOMETHING AWFUL MIGHT HAPPEN: NOT AT ALL
6. BECOMING EASILY ANNOYED OR IRRITABLE: NOT AT ALL

## 2024-06-18 ASSESSMENT — PATIENT HEALTH QUESTIONNAIRE - PHQ9
10. IF YOU CHECKED OFF ANY PROBLEMS, HOW DIFFICULT HAVE THESE PROBLEMS MADE IT FOR YOU TO DO YOUR WORK, TAKE CARE OF THINGS AT HOME, OR GET ALONG WITH OTHER PEOPLE: NOT DIFFICULT AT ALL
SUM OF ALL RESPONSES TO PHQ QUESTIONS 1-9: 0
SUM OF ALL RESPONSES TO PHQ QUESTIONS 1-9: 0

## 2024-06-18 NOTE — PROGRESS NOTES
"  Assessment & Plan     Benign essential hypertension  In good range on current dose   Needs lab recheck   - losartan-hydrochlorothiazide (HYZAAR) 50-12.5 MG tablet; Take 1 tablet by mouth daily  - Basic metabolic panel  (Ca, Cl, CO2, Creat, Gluc, K, Na, BUN); Future  - Basic metabolic panel  (Ca, Cl, CO2, Creat, Gluc, K, Na, BUN)    Systolic murmur  Present   - losartan-hydrochlorothiazide (HYZAAR) 50-12.5 MG tablet; Take 1 tablet by mouth daily    LVH (left ventricular hypertrophy)  Saw cardiology and they just wanted to get blood pressure under control   - losartan-hydrochlorothiazide (HYZAAR) 50-12.5 MG tablet; Take 1 tablet by mouth daily  - Basic metabolic panel  (Ca, Cl, CO2, Creat, Gluc, K, Na, BUN); Future  - Basic metabolic panel  (Ca, Cl, CO2, Creat, Gluc, K, Na, BUN)            Patient Instructions   Lab in [address]    No change in medication         Subjective   Isak is a 59 year old, presenting for the following health issues:  Hypertension        6/18/2024     7:48 AM   Additional Questions   Roomed by Elaina TOMPKINS     History of Present Illness       Hypertension: He presents for follow up of hypertension.  He does check blood pressure  regularly outside of the clinic. Outpatient blood pressures have not been over 140/90. He follows a low salt diet.     He eats 0-1 servings of fruits and vegetables daily.He consumes 1 sweetened beverage(s) daily.He exercises with enough effort to increase his heart rate 30 to 60 minutes per day.  He exercises with enough effort to increase his heart rate 5 days per week.   He is taking medications regularly.    Blood pressure in good range at home and here       Review of Systems  Constitutional, neuro, ENT, endocrine, pulmonary, cardiac, gastrointestinal, genitourinary, musculoskeletal, integument and psychiatric systems are negative, except as otherwise noted.      Objective    /78   Pulse 55   Temp 97.9  F (36.6  C) (Oral)   Resp 18   Ht 1.753 m (5' 9\")  "  Wt 90.3 kg (199 lb)   SpO2 99%   BMI 29.39 kg/m    Body mass index is 29.39 kg/m .  Physical Exam   GENERAL: alert and no distress    RESP: lungs clear to auscultation - no rales, rhonchi or wheezes  CV: regular rate and rhythm, systolic murmur   ABDOMEN: soft, nontender, and bowel sounds normal  MS: no gross musculoskeletal defects noted, no edema  SKIN: no suspicious lesions or rashes  NEURO: Normal strength and tone, mentation intact and speech normal  PSYCH: mentation appears normal, affect normal/bright    Lab         Signed Electronically by: MINERVA Garcia CNP

## 2024-06-18 NOTE — NURSING NOTE
"Chief Complaint   Patient presents with    Hypertension     initial /78   Pulse 55   Temp 97.9  F (36.6  C) (Oral)   Resp 18   Ht 1.753 m (5' 9\")   Wt 90.3 kg (199 lb)   SpO2 99%   BMI 29.39 kg/m   Estimated body mass index is 29.39 kg/m  as calculated from the following:    Height as of this encounter: 1.753 m (5' 9\").    Weight as of this encounter: 90.3 kg (199 lb)..  bp completed using cuff size large  GRETCHEN DOUGLAS LPN  "

## 2024-06-19 LAB
ANION GAP SERPL CALCULATED.3IONS-SCNC: 9 MMOL/L (ref 7–15)
BUN SERPL-MCNC: 17 MG/DL (ref 8–23)
CALCIUM SERPL-MCNC: 9.5 MG/DL (ref 8.6–10)
CHLORIDE SERPL-SCNC: 106 MMOL/L (ref 98–107)
CREAT SERPL-MCNC: 1.29 MG/DL (ref 0.67–1.17)
DEPRECATED HCO3 PLAS-SCNC: 27 MMOL/L (ref 22–29)
EGFRCR SERPLBLD CKD-EPI 2021: 64 ML/MIN/1.73M2
GLUCOSE SERPL-MCNC: 87 MG/DL (ref 70–99)
POTASSIUM SERPL-SCNC: 4 MMOL/L (ref 3.4–5.3)
SODIUM SERPL-SCNC: 142 MMOL/L (ref 135–145)

## 2024-06-30 ENCOUNTER — HEALTH MAINTENANCE LETTER (OUTPATIENT)
Age: 59
End: 2024-06-30

## 2025-07-07 ENCOUNTER — TELEPHONE (OUTPATIENT)
Dept: INTERNAL MEDICINE | Facility: CLINIC | Age: 60
End: 2025-07-07
Payer: COMMERCIAL

## 2025-07-07 NOTE — TELEPHONE ENCOUNTER
Patient Quality Outreach    Patient is due for the following:   Hypertension -  Hypertension follow-up visit  Physical Preventive Adult Physical    Action(s) Taken:   Schedule a office visit for BP Adult Preventative    Type of outreach:    Phone, left message for patient/parent to call back.(Not sure if answering machined worked.)     Questions for provider review:    None         Francheska Mcfarlane LPN  Chart routed to None.

## 2025-07-13 ENCOUNTER — HEALTH MAINTENANCE LETTER (OUTPATIENT)
Age: 60
End: 2025-07-13

## 2025-08-26 DIAGNOSIS — I51.7 LVH (LEFT VENTRICULAR HYPERTROPHY): ICD-10-CM

## 2025-08-26 DIAGNOSIS — R01.1 SYSTOLIC MURMUR: ICD-10-CM

## 2025-08-26 DIAGNOSIS — I10 BENIGN ESSENTIAL HYPERTENSION: ICD-10-CM

## 2025-08-27 RX ORDER — LOSARTAN POTASSIUM AND HYDROCHLOROTHIAZIDE 12.5; 5 MG/1; MG/1
1 TABLET ORAL DAILY
Qty: 90 TABLET | Refills: 3 | Status: SHIPPED | OUTPATIENT
Start: 2025-08-27

## 2025-09-03 ENCOUNTER — OFFICE VISIT (OUTPATIENT)
Dept: INTERNAL MEDICINE | Facility: CLINIC | Age: 60
End: 2025-09-03
Payer: COMMERCIAL

## 2025-09-03 VITALS
OXYGEN SATURATION: 98 % | WEIGHT: 192.9 LBS | HEART RATE: 76 BPM | TEMPERATURE: 98.2 F | BODY MASS INDEX: 28.57 KG/M2 | RESPIRATION RATE: 18 BRPM | DIASTOLIC BLOOD PRESSURE: 74 MMHG | HEIGHT: 69 IN | SYSTOLIC BLOOD PRESSURE: 110 MMHG

## 2025-09-03 DIAGNOSIS — R10.31 RLQ ABDOMINAL PAIN: Primary | ICD-10-CM

## 2025-09-03 LAB
BASOPHILS # BLD AUTO: <0.04 10E3/UL (ref 0–0.2)
BASOPHILS NFR BLD AUTO: 0.3 %
EOSINOPHIL # BLD AUTO: 0.09 10E3/UL (ref 0–0.7)
EOSINOPHIL NFR BLD AUTO: 1.3 %
ERYTHROCYTE [DISTWIDTH] IN BLOOD BY AUTOMATED COUNT: 12.1 % (ref 10–15)
HCT VFR BLD AUTO: 41.8 % (ref 40–53)
HGB BLD-MCNC: 14.1 G/DL (ref 13.3–17.7)
IMM GRANULOCYTES # BLD: 0.05 10E3/UL
IMM GRANULOCYTES NFR BLD: 0.7 %
LYMPHOCYTES # BLD AUTO: 1.09 10E3/UL (ref 0.8–5.3)
LYMPHOCYTES NFR BLD AUTO: 15.3 %
MCH RBC QN AUTO: 29.7 PG (ref 26.5–33)
MCHC RBC AUTO-ENTMCNC: 33.7 G/DL (ref 31.5–36.5)
MCV RBC AUTO: 88 FL (ref 78–100)
MONOCYTES # BLD AUTO: 0.92 10E3/UL (ref 0–1.3)
MONOCYTES NFR BLD AUTO: 12.9 %
NEUTROPHILS # BLD AUTO: 4.94 10E3/UL (ref 1.6–8.3)
NEUTROPHILS NFR BLD AUTO: 69.5 %
PLATELET # BLD AUTO: 336 10E3/UL (ref 150–450)
RBC # BLD AUTO: 4.75 10E6/UL (ref 4.4–5.9)
WBC # BLD AUTO: 7.11 10E3/UL (ref 4–11)

## 2025-09-03 PROCEDURE — 85025 COMPLETE CBC W/AUTO DIFF WBC: CPT

## 2025-09-03 PROCEDURE — 3078F DIAST BP <80 MM HG: CPT

## 2025-09-03 PROCEDURE — 36415 COLL VENOUS BLD VENIPUNCTURE: CPT

## 2025-09-03 PROCEDURE — 99214 OFFICE O/P EST MOD 30 MIN: CPT

## 2025-09-03 PROCEDURE — 3074F SYST BP LT 130 MM HG: CPT

## 2025-09-03 PROCEDURE — 1126F AMNT PAIN NOTED NONE PRSNT: CPT

## 2025-09-03 ASSESSMENT — ENCOUNTER SYMPTOMS: ABDOMINAL PAIN: 1

## 2025-09-03 ASSESSMENT — PAIN SCALES - GENERAL: PAINLEVEL_OUTOF10: NO PAIN (0)
